# Patient Record
Sex: FEMALE | NOT HISPANIC OR LATINO | Employment: PART TIME | URBAN - METROPOLITAN AREA
[De-identification: names, ages, dates, MRNs, and addresses within clinical notes are randomized per-mention and may not be internally consistent; named-entity substitution may affect disease eponyms.]

---
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
## 2025-02-11 ENCOUNTER — OFFICE (OUTPATIENT)
Dept: URBAN - METROPOLITAN AREA CLINIC 64 | Facility: CLINIC | Age: 20
End: 2025-02-11

## 2025-02-11 VITALS
HEIGHT: 62 IN | SYSTOLIC BLOOD PRESSURE: 138 MMHG | HEART RATE: 75 BPM | DIASTOLIC BLOOD PRESSURE: 85 MMHG | WEIGHT: 98 LBS | SYSTOLIC BLOOD PRESSURE: 131 MMHG | DIASTOLIC BLOOD PRESSURE: 96 MMHG

## 2025-02-11 DIAGNOSIS — R42 DIZZINESS AND GIDDINESS: ICD-10-CM

## 2025-02-11 DIAGNOSIS — R68.81 EARLY SATIETY: ICD-10-CM

## 2025-02-11 DIAGNOSIS — R11.0 NAUSEA: ICD-10-CM

## 2025-02-11 DIAGNOSIS — R35.1 NOCTURIA: ICD-10-CM

## 2025-02-11 DIAGNOSIS — K30 FUNCTIONAL DYSPEPSIA: ICD-10-CM

## 2025-02-11 DIAGNOSIS — K59.00 CONSTIPATION, UNSPECIFIED: ICD-10-CM

## 2025-02-11 PROCEDURE — 99204 OFFICE O/P NEW MOD 45 MIN: CPT | Performed by: INTERNAL MEDICINE

## 2025-02-11 RX ORDER — MAGNESIUM OXIDE 400 MG
400 TABLET ORAL
Qty: 30 | Refills: 1 | Status: ACTIVE
Start: 2025-02-11

## 2025-02-11 RX ORDER — OMEPRAZOLE 40 MG/1
CAPSULE, DELAYED RELEASE ORAL
Qty: 90 | Refills: 0 | Status: ACTIVE

## 2025-02-12 LAB
H PYLORI BREATH TEST: NEGATIVE
H. PYLORI BREATH COLLECTION: (no result)

## 2025-03-11 ENCOUNTER — OFFICE (OUTPATIENT)
Dept: URBAN - METROPOLITAN AREA CLINIC 64 | Facility: CLINIC | Age: 20
End: 2025-03-11
Payer: COMMERCIAL

## 2025-03-11 VITALS
SYSTOLIC BLOOD PRESSURE: 125 MMHG | HEIGHT: 62 IN | DIASTOLIC BLOOD PRESSURE: 99 MMHG | HEART RATE: 87 BPM | WEIGHT: 94 LBS | DIASTOLIC BLOOD PRESSURE: 92 MMHG

## 2025-03-11 DIAGNOSIS — K59.00 CONSTIPATION, UNSPECIFIED: ICD-10-CM

## 2025-03-11 DIAGNOSIS — R68.81 EARLY SATIETY: ICD-10-CM

## 2025-03-11 DIAGNOSIS — R42 DIZZINESS AND GIDDINESS: ICD-10-CM

## 2025-03-11 DIAGNOSIS — K30 FUNCTIONAL DYSPEPSIA: ICD-10-CM

## 2025-03-11 DIAGNOSIS — R35.1 NOCTURIA: ICD-10-CM

## 2025-03-11 DIAGNOSIS — R11.0 NAUSEA: ICD-10-CM

## 2025-03-11 PROCEDURE — 99213 OFFICE O/P EST LOW 20 MIN: CPT

## 2025-03-12 LAB
TTG IGA/DGP IGG COMBO SCREEN: DEAMIDATED GLIADIN ABS, IGG: 2 UNITS (ref 0–19)
TTG IGA/DGP IGG COMBO SCREEN: T-TRANSGLUTAMINASE (TTG) IGA: <2 U/ML

## 2025-03-17 ENCOUNTER — TRANSCRIBE ORDERS (OUTPATIENT)
Dept: ADMINISTRATIVE | Facility: HOSPITAL | Age: 20
End: 2025-03-17
Payer: COMMERCIAL

## 2025-03-17 DIAGNOSIS — R11.0 NAUSEA: ICD-10-CM

## 2025-03-17 DIAGNOSIS — R68.81 EARLY SATIETY: ICD-10-CM

## 2025-03-17 DIAGNOSIS — R10.13 DYSPEPSIA: Primary | ICD-10-CM

## 2025-03-31 ENCOUNTER — ANESTHESIA EVENT (OUTPATIENT)
Dept: GASTROENTEROLOGY | Facility: HOSPITAL | Age: 20
End: 2025-03-31
Payer: COMMERCIAL

## 2025-04-01 ENCOUNTER — ANESTHESIA (OUTPATIENT)
Dept: GASTROENTEROLOGY | Facility: HOSPITAL | Age: 20
End: 2025-04-01
Payer: COMMERCIAL

## 2025-04-01 ENCOUNTER — HOSPITAL ENCOUNTER (OUTPATIENT)
Facility: HOSPITAL | Age: 20
Setting detail: HOSPITAL OUTPATIENT SURGERY
Discharge: HOME | End: 2025-04-01
Attending: INTERNAL MEDICINE | Admitting: INTERNAL MEDICINE
Payer: COMMERCIAL

## 2025-04-01 ENCOUNTER — ON CAMPUS - OUTPATIENT (OUTPATIENT)
Dept: URBAN - METROPOLITAN AREA HOSPITAL 85 | Facility: HOSPITAL | Age: 20
End: 2025-04-01

## 2025-04-01 VITALS
DIASTOLIC BLOOD PRESSURE: 45 MMHG | BODY MASS INDEX: 17.3 KG/M2 | HEIGHT: 62 IN | OXYGEN SATURATION: 100 % | RESPIRATION RATE: 19 BRPM | SYSTOLIC BLOOD PRESSURE: 101 MMHG | WEIGHT: 94 LBS | TEMPERATURE: 98.5 F | HEART RATE: 96 BPM

## 2025-04-01 DIAGNOSIS — R35.0 URINARY FREQUENCY: ICD-10-CM

## 2025-04-01 DIAGNOSIS — R11.2 NAUSEA WITH VOMITING, UNSPECIFIED: ICD-10-CM

## 2025-04-01 DIAGNOSIS — K30 FUNCTIONAL DYSPEPSIA: ICD-10-CM

## 2025-04-01 DIAGNOSIS — K20.90 ESOPHAGITIS, UNSPECIFIED WITHOUT BLEEDING: ICD-10-CM

## 2025-04-01 DIAGNOSIS — R68.81 EARLY SATIETY: ICD-10-CM

## 2025-04-01 DIAGNOSIS — K59.00 CONSTIPATION: ICD-10-CM

## 2025-04-01 DIAGNOSIS — K29.50 UNSPECIFIED CHRONIC GASTRITIS WITHOUT BLEEDING: ICD-10-CM

## 2025-04-01 DIAGNOSIS — K30 STOMACH UPSET: ICD-10-CM

## 2025-04-01 DIAGNOSIS — R11.2 NAUSEA WITH VOMITING: ICD-10-CM

## 2025-04-01 LAB — B-HCG UR QL: NEGATIVE

## 2025-04-01 PROCEDURE — 25010000002 PROPOFOL 10 MG/ML EMULSION

## 2025-04-01 PROCEDURE — 25010000002 LIDOCAINE PF 2% 2 % SOLUTION

## 2025-04-01 PROCEDURE — 81025 URINE PREGNANCY TEST: CPT | Performed by: INTERNAL MEDICINE

## 2025-04-01 PROCEDURE — 43239 EGD BIOPSY SINGLE/MULTIPLE: CPT | Performed by: INTERNAL MEDICINE

## 2025-04-01 PROCEDURE — 25810000003 SODIUM CHLORIDE 0.9 % SOLUTION

## 2025-04-01 PROCEDURE — 88342 IMHCHEM/IMCYTCHM 1ST ANTB: CPT | Performed by: INTERNAL MEDICINE

## 2025-04-01 PROCEDURE — 88305 TISSUE EXAM BY PATHOLOGIST: CPT | Performed by: INTERNAL MEDICINE

## 2025-04-01 RX ORDER — EPHEDRINE SULFATE 5 MG/ML
5 INJECTION INTRAVENOUS ONCE AS NEEDED
Status: DISCONTINUED | OUTPATIENT
Start: 2025-04-01 | End: 2025-04-01 | Stop reason: HOSPADM

## 2025-04-01 RX ORDER — ONDANSETRON 2 MG/ML
4 INJECTION INTRAMUSCULAR; INTRAVENOUS ONCE AS NEEDED
Status: DISCONTINUED | OUTPATIENT
Start: 2025-04-01 | End: 2025-04-01 | Stop reason: HOSPADM

## 2025-04-01 RX ORDER — LABETALOL HYDROCHLORIDE 5 MG/ML
5 INJECTION, SOLUTION INTRAVENOUS
Status: DISCONTINUED | OUTPATIENT
Start: 2025-04-01 | End: 2025-04-01 | Stop reason: HOSPADM

## 2025-04-01 RX ORDER — LIDOCAINE HYDROCHLORIDE 20 MG/ML
INJECTION, SOLUTION EPIDURAL; INFILTRATION; INTRACAUDAL; PERINEURAL AS NEEDED
Status: DISCONTINUED | OUTPATIENT
Start: 2025-04-01 | End: 2025-04-01 | Stop reason: SURG

## 2025-04-01 RX ORDER — MEPERIDINE HYDROCHLORIDE 25 MG/ML
12.5 INJECTION INTRAMUSCULAR; INTRAVENOUS; SUBCUTANEOUS
Status: DISCONTINUED | OUTPATIENT
Start: 2025-04-01 | End: 2025-04-01 | Stop reason: HOSPADM

## 2025-04-01 RX ORDER — DIPHENHYDRAMINE HYDROCHLORIDE 50 MG/ML
12.5 INJECTION, SOLUTION INTRAMUSCULAR; INTRAVENOUS
Status: DISCONTINUED | OUTPATIENT
Start: 2025-04-01 | End: 2025-04-01 | Stop reason: HOSPADM

## 2025-04-01 RX ORDER — SODIUM CHLORIDE 9 MG/ML
INJECTION, SOLUTION INTRAVENOUS CONTINUOUS PRN
Status: DISCONTINUED | OUTPATIENT
Start: 2025-04-01 | End: 2025-04-01 | Stop reason: SURG

## 2025-04-01 RX ORDER — PROPOFOL 10 MG/ML
VIAL (ML) INTRAVENOUS AS NEEDED
Status: DISCONTINUED | OUTPATIENT
Start: 2025-04-01 | End: 2025-04-01 | Stop reason: SURG

## 2025-04-01 RX ORDER — HYDRALAZINE HYDROCHLORIDE 20 MG/ML
5 INJECTION INTRAMUSCULAR; INTRAVENOUS
Status: DISCONTINUED | OUTPATIENT
Start: 2025-04-01 | End: 2025-04-01 | Stop reason: HOSPADM

## 2025-04-01 RX ORDER — IPRATROPIUM BROMIDE AND ALBUTEROL SULFATE 2.5; .5 MG/3ML; MG/3ML
3 SOLUTION RESPIRATORY (INHALATION) ONCE AS NEEDED
Status: DISCONTINUED | OUTPATIENT
Start: 2025-04-01 | End: 2025-04-01 | Stop reason: HOSPADM

## 2025-04-01 RX ADMIN — PROPOFOL 120 MG: 10 INJECTION, EMULSION INTRAVENOUS at 09:29

## 2025-04-01 RX ADMIN — LIDOCAINE HYDROCHLORIDE 50 MG: 20 INJECTION, SOLUTION EPIDURAL; INFILTRATION; INTRACAUDAL; PERINEURAL at 09:29

## 2025-04-01 RX ADMIN — SODIUM CHLORIDE: 9 INJECTION, SOLUTION INTRAVENOUS at 09:23

## 2025-04-01 RX ADMIN — PROPOFOL 50 MG: 10 INJECTION, EMULSION INTRAVENOUS at 09:30

## 2025-04-01 RX ADMIN — PROPOFOL 50 MG: 10 INJECTION, EMULSION INTRAVENOUS at 09:32

## 2025-04-01 NOTE — H&P
GI PREOPERATIVE HISTORY AND PHYSICAL:    Referring Provider:    Virginia Fried MD    Chief complaint: Nausea    Subjective .     History of present illness:      Virginie Leonard is a 19 y.o. female who presents today for Procedure(s):  ESOPHAGOGASTRODUODENOSCOPY for the indications listed below.     Nausea    The updated Patient Profile was reviewed prior to the procedure, in conjunction with the Physical Exam, including medical conditions, surgical procedures, medications, allergies, family history and social history.     Pre-operatively, I reviewed the indication(s) for the procedure, the risks of the procedure [including but not limited to: unexpected bleeding possibly requiring hospitalization and/or unplanned repeat procedures, perforation possibly requiring surgical treatment, missed lesions and complications of sedation/MAC (also explained by anesthesia staff)].     I have evaluated the patient for risks associated with the planned anesthesia and the procedure to be performed and find the patient an acceptable candidate for IV sedation.    Multiple opportunities were provided for any questions or concerns, and all questions were answered satisfactorily before any anesthesia was administered. We will proceed with the planned procedure.    Past Medical History:  Past Medical History:   Diagnosis Date    Bloating        Past Surgical History:  Past Surgical History:   Procedure Laterality Date    NO PAST SURGERIES         Social History:  Social History     Tobacco Use    Smoking status: Never    Smokeless tobacco: Never   Vaping Use    Vaping status: Never Used   Substance Use Topics    Alcohol use: Never    Drug use: Never       Family History:  History reviewed. No pertinent family history.    Medications:  No medications prior to admission.       Scheduled Meds:  Continuous Infusions:No current facility-administered medications for this encounter.    PRN Meds:.    ALLERGIES:  Patient has no known  "allergies.    ROS:  The following systems were reviewed and negative;   Constitution:  No fevers, chills, no unintentional weight loss  Skin: no rash, no jaundice  Eyes:  No blurry vision, no eye pain  HENT:  No change in hearing or smell  Resp:  No dyspnea or cough  CV:  No chest pain or palpitations  :  No dysuria, hematuria  Musculoskeletal:  No leg cramps or arthralgias  Neuro:  No tremor, no numbness  Psych:  No depression or confsuion    Objective     Vital Signs:   Vitals:    03/25/25 1010   Weight: 42.6 kg (94 lb)   Height: 154.9 cm (61\")       Physical Exam:       General Appearance:    Awake and alert, in no acute distress   Head:    Normocephalic, without obvious abnormality, atraumatic   Throat:   No oral lesions, no thrush, oral mucosa moist   Lungs  Cardiac:  Abdomen:  Extremities:     Respirations regular, even and unlabored    Regular rate and rhythm, no murmur, gallop, rub    Non-distended, good bowel sounds, non tender, no masses     No edema, pulses 2+   Skin:   No rash, no jaundice       Results Review:  Lab Results (last 24 hours)       ** No results found for the last 24 hours. **            Imaging Results (Last 24 Hours)       ** No results found for the last 24 hours. **             I reviewed the patient's labs and imaging.    ASSESSMENT AND PLAN:  Nausea    Active Problems:    * No active hospital problems. *       Procedure(s):  ESOPHAGOGASTRODUODENOSCOPY      I discussed the patients findings and my recommendations with the patient.    Sukhjinder Parmar MD  04/01/25  07:45 EDT    "

## 2025-04-01 NOTE — DISCHARGE INSTRUCTIONS
A responsible adult should stay with you and you should rest quietly for the rest of the day.    Do not drink alcohol, drive, operate any heavy machinery or power tools or make any legal/important decisions for the next 24 hours.    Progress your diet as tolerated.  If you begin to experience severe pain, increased shortness of breath, racing heartbeat or a fever above 101 F, seek immediate medical attention.    Follow up with MD as instructed. Call office for results in 3 to 5 days if needed.     Dr. Parmar's Office [phone]    Findings:  Esophagus:     Tiny erosions at the GE junction consistent with grade a erosive esophagitis  Stomach:         Diffuse erythema consistent with nonerosive gastritis  Duodenum:     Normal     Impression:  1.  Grade a erosive esophagitis  2.  Nonerosive gastritis     Recommendations:  1.  Proton pump inhibitor daily  2.  Consider trial of azithromycin to help with gastric emptying  3.  Follow-up in my office as scheduled

## 2025-04-01 NOTE — ANESTHESIA PREPROCEDURE EVALUATION
Anesthesia Evaluation     Patient summary reviewed and Nursing notes reviewed                Airway   Mallampati: I  TM distance: >3 FB  Neck ROM: full  No difficulty expected  Dental - normal exam     Pulmonary - negative pulmonary ROS and normal exam   Cardiovascular - negative cardio ROS and normal exam        Neuro/Psych- negative ROS  GI/Hepatic/Renal/Endo - negative ROS     Musculoskeletal (-) negative ROS    Abdominal  - normal exam    Bowel sounds: normal.   Substance History - negative use     OB/GYN negative ob/gyn ROS         Other        ROS/Med Hx Other: PMHX  HCG?              Anesthesia Plan    ASA 1     general   total IV anesthesia  intravenous induction     Anesthetic plan, risks, benefits, and alternatives have been provided, discussed and informed consent has been obtained with: patient.  Pre-procedure education provided  Plan discussed with CRNA.    CODE STATUS:

## 2025-04-01 NOTE — OP NOTE
ESOPHAGOGASTRODUODENOSCOPY Procedure Report    Patient Name:  Virignie Leonard  YOB: 2005    Date of Surgery:  4/1/2025     Pre-Op Diagnosis:  Stomach upset [K30]  Early satiety [R68.81]  Nausea with vomiting [R11.2]  Urinary frequency [R35.0]  Constipation [K59.00]    Post-Op Diagnosis:  1.  Erosive esophagitis  2.  Nonerosive gastritis       Procedure/CPT® Codes:  CA ESOPHAGOGASTRODUODENOSCOPY TRANSORAL DIAGNOSTIC [53694]    Procedure(s):  ESOPHAGOGASTRODUODENOSCOPY with biopsy x 1 area    Staff:  Surgeon(s):  Sukhjinder Parmar MD      Anesthesia: Monitored Anesthesia Care    Description of Procedure:  A description of the procedure as well as risks, benefits and alternative methods were explained to the patient who voiced understanding and signed the corresponding consent form. A physical exam was performed and vital signs were monitored throughout the procedure.    After informed consent was obtained, the patient was placed in the left lateral decubitus position and sedated as above.  The Olympus video gastroscope was inserted into the oropharynx and the esophagus was intubated without difficulty.  Examination of the esophagus, stomach, pylorus, duodenal bulb, and second portion of the duodenum was performed without difficulty. The scope was then retroflexed and the fundus was visualized. The procedure was not difficult and there were no immediate complications.  There was no blood loss.    Findings:  Esophagus: Tiny erosions at the GE junction consistent with grade a erosive esophagitis  Stomach: Diffuse erythema consistent with nonerosive gastritis  Duodenum: Normal    Impression:  1.  Grade a erosive esophagitis  2.  Nonerosive gastritis    Recommendations:  1.  Proton pump inhibitor daily  2.  Consider trial of azithromycin to help with gastric emptying  3.  Follow-up in my office as scheduled      Sukhjinder Parmar MD     Date: 4/1/2025    Time: 09:36 EDT      .

## 2025-04-01 NOTE — ANESTHESIA POSTPROCEDURE EVALUATION
Patient: Virginie Leonard    Procedure Summary       Date: 04/01/25 Room / Location: [facility] ENDOSCOPY 4 / [facility] ENDOSCOPY    Anesthesia Start: 0923 Anesthesia Stop: 0939    Procedure: ESOPHAGOGASTRODUODENOSCOPY with biopsy x 1 area Diagnosis:       Stomach upset      Early satiety      Nausea with vomiting      Urinary frequency      Constipation      (Stomach upset [K30])      (Early satiety [R68.81])      (Nausea with vomiting [R11.2])      (Urinary frequency [R35.0])      (Constipation [K59.00])    Surgeons: Sukhjinder Parmar MD Provider: Jass Landeros MD    Anesthesia Type: general ASA Status: 1            Anesthesia Type: general    Vitals  Vitals Value Taken Time   /70 04/01/25 09:57   Temp     Pulse 84 04/01/25 09:57   Resp 19 04/01/25 09:47   SpO2 100 % 04/01/25 09:57   Vitals shown include unfiled device data.        Post Anesthesia Care and Evaluation    Patient location during evaluation: PACU  Patient participation: complete - patient participated  Level of consciousness: awake  Pain scale: See nurse's notes for pain score.  Pain management: adequate    Airway patency: patent  Anesthetic complications: No anesthetic complications  PONV Status: none  Cardiovascular status: acceptable  Respiratory status: acceptable and spontaneous ventilation  Hydration status: acceptable    Comments: Patient seen and examined postoperatively; vital signs stable; SpO2 greater than or equal to 90%; cardiopulmonary status stable; nausea/vomiting adequately controlled; pain adequately controlled; no apparent anesthesia complications; patient discharged from anesthesia care when discharge criteria were met

## 2025-04-02 ENCOUNTER — HOSPITAL ENCOUNTER (OUTPATIENT)
Dept: NUCLEAR MEDICINE | Facility: HOSPITAL | Age: 20
Discharge: HOME | End: 2025-04-02
Payer: COMMERCIAL

## 2025-04-02 DIAGNOSIS — R11.0 NAUSEA: ICD-10-CM

## 2025-04-02 DIAGNOSIS — R68.81 EARLY SATIETY: ICD-10-CM

## 2025-04-02 DIAGNOSIS — R10.13 DYSPEPSIA: ICD-10-CM

## 2025-04-02 LAB
LAB AP CASE REPORT: NORMAL
PATH REPORT.FINAL DX SPEC: NORMAL
PATH REPORT.GROSS SPEC: NORMAL

## 2025-04-02 PROCEDURE — 34310000005 TECHNETIUM SULFUR COLLOID: Performed by: FAMILY MEDICINE

## 2025-04-02 PROCEDURE — A9541 TC99M SULFUR COLLOID: HCPCS | Performed by: FAMILY MEDICINE

## 2025-04-02 PROCEDURE — 78264 GASTRIC EMPTYING IMG STUDY: CPT

## 2025-04-02 RX ADMIN — TECHNETIUM TC 99M SULFUR COLLOID 1 DOSE: KIT at 07:50

## 2025-04-22 ENCOUNTER — OFFICE (OUTPATIENT)
Dept: URBAN - METROPOLITAN AREA CLINIC 64 | Facility: CLINIC | Age: 20
End: 2025-04-22
Payer: COMMERCIAL

## 2025-04-22 VITALS
WEIGHT: 91 LBS | HEART RATE: 72 BPM | SYSTOLIC BLOOD PRESSURE: 121 MMHG | DIASTOLIC BLOOD PRESSURE: 86 MMHG | HEIGHT: 62 IN

## 2025-04-22 DIAGNOSIS — K30 FUNCTIONAL DYSPEPSIA: ICD-10-CM

## 2025-04-22 DIAGNOSIS — R63.4 ABNORMAL WEIGHT LOSS: ICD-10-CM

## 2025-04-22 DIAGNOSIS — R42 DIZZINESS AND GIDDINESS: ICD-10-CM

## 2025-04-22 DIAGNOSIS — R68.81 EARLY SATIETY: ICD-10-CM

## 2025-04-22 DIAGNOSIS — R11.0 NAUSEA: ICD-10-CM

## 2025-04-22 PROCEDURE — 99214 OFFICE O/P EST MOD 30 MIN: CPT

## 2025-04-22 RX ORDER — MECLIZINE HYDROCLORIDE 25 MG/1
25 TABLET ORAL
Qty: 90 | Refills: 3 | Status: ACTIVE
Start: 2025-04-22

## (undated) DEVICE — PK ENDO GI 50

## (undated) DEVICE — SINGLE-USE BIOPSY FORCEPS: Brand: RADIAL JAW 4

## (undated) DEVICE — BITEBLOCK ENDO W/STRAP 60F A/ LF DISP